# Patient Record
Sex: FEMALE
[De-identification: names, ages, dates, MRNs, and addresses within clinical notes are randomized per-mention and may not be internally consistent; named-entity substitution may affect disease eponyms.]

---

## 2017-08-03 ENCOUNTER — LAB REQUISITION (OUTPATIENT)
Dept: ADMINISTRATIVE | Age: 57
End: 2017-08-03
Payer: COMMERCIAL

## 2017-08-03 DIAGNOSIS — F19.10 DRUG ABUSE (HCC): ICD-10-CM

## 2017-08-03 DIAGNOSIS — F33.2 SEVERE RECURRENT MAJOR DEPRESSION WITHOUT PSYCHOTIC FEATURES (HCC): ICD-10-CM

## 2017-08-03 LAB
B-HCG UR QL: NEGATIVE
BACTERIA UR QL AUTO: NEGATIVE /HPF
BILIRUB UR QL: NEGATIVE
CLARITY UR: CLEAR
COLOR UR: YELLOW
GLUCOSE UR-MCNC: NEGATIVE MG/DL
HGB UR QL STRIP.AUTO: NEGATIVE
HYALINE CASTS #/AREA URNS AUTO: 1 /LPF
KETONES UR-MCNC: NEGATIVE MG/DL
NITRITE UR QL STRIP.AUTO: NEGATIVE
PH UR: 5 [PH] (ref 5–8)
PROT UR-MCNC: NEGATIVE MG/DL
RBC #/AREA URNS AUTO: 1 /HPF
SP GR UR STRIP: 1.01 (ref 1–1.03)
UROBILINOGEN UR STRIP-ACNC: <2
VIT C UR-MCNC: 20 MG/DL
WBC #/AREA URNS AUTO: 1 /HPF

## 2017-08-03 PROCEDURE — 81025 URINE PREGNANCY TEST: CPT | Performed by: OTHER

## 2017-08-03 PROCEDURE — 81001 URINALYSIS AUTO W/SCOPE: CPT | Performed by: OTHER

## 2017-08-07 LAB
ALBUMIN SERPL BCP-MCNC: 4 G/DL (ref 3.5–4.8)
ALBUMIN/GLOB SERPL: 1.5 {RATIO} (ref 1–2)
ALP SERPL-CCNC: 105 U/L (ref 32–100)
ALT SERPL-CCNC: 23 U/L (ref 14–54)
ANION GAP SERPL CALC-SCNC: 8 MMOL/L (ref 0–18)
AST SERPL-CCNC: 29 U/L (ref 15–41)
BASOPHILS # BLD: 0 K/UL (ref 0–0.2)
BASOPHILS NFR BLD: 1 %
BILIRUB SERPL-MCNC: 0.4 MG/DL (ref 0.3–1.2)
BUN SERPL-MCNC: 13 MG/DL (ref 8–20)
BUN/CREAT SERPL: 19.4 (ref 10–20)
CALCIUM SERPL-MCNC: 9.2 MG/DL (ref 8.5–10.5)
CHLORIDE SERPL-SCNC: 106 MMOL/L (ref 95–110)
CO2 SERPL-SCNC: 26 MMOL/L (ref 22–32)
CREAT SERPL-MCNC: 0.67 MG/DL (ref 0.5–1.5)
EOSINOPHIL # BLD: 0.2 K/UL (ref 0–0.7)
EOSINOPHIL NFR BLD: 4 %
ERYTHROCYTE [DISTWIDTH] IN BLOOD BY AUTOMATED COUNT: 15.5 % (ref 11–15)
GLOBULIN PLAS-MCNC: 2.6 G/DL (ref 2.5–3.7)
GLUCOSE SERPL-MCNC: 46 MG/DL (ref 70–99)
HCT VFR BLD AUTO: 39.2 % (ref 35–48)
HGB BLD-MCNC: 13.1 G/DL (ref 12–16)
LYMPHOCYTES # BLD: 1.2 K/UL (ref 1–4)
LYMPHOCYTES NFR BLD: 22 %
MCH RBC QN AUTO: 28.6 PG (ref 27–32)
MCHC RBC AUTO-ENTMCNC: 33.4 G/DL (ref 32–37)
MCV RBC AUTO: 85.5 FL (ref 80–100)
MONOCYTES # BLD: 0.6 K/UL (ref 0–1)
MONOCYTES NFR BLD: 10 %
NEUTROPHILS # BLD AUTO: 3.5 K/UL (ref 1.8–7.7)
NEUTROPHILS NFR BLD: 63 %
OSMOLALITY UR CALC.SUM OF ELEC: 287 MOSM/KG (ref 275–295)
PLATELET # BLD AUTO: 245 K/UL (ref 140–400)
PMV BLD AUTO: 9.5 FL (ref 7.4–10.3)
POTASSIUM SERPL-SCNC: 3.7 MMOL/L (ref 3.3–5.1)
PROT SERPL-MCNC: 6.6 G/DL (ref 5.9–8.4)
RBC # BLD AUTO: 4.58 M/UL (ref 3.7–5.4)
SODIUM SERPL-SCNC: 140 MMOL/L (ref 136–144)
TSH SERPL-ACNC: 1.12 UIU/ML (ref 0.45–5.33)
WBC # BLD AUTO: 5.6 K/UL (ref 4–11)

## 2017-08-07 PROCEDURE — 36415 COLL VENOUS BLD VENIPUNCTURE: CPT | Performed by: OTHER

## 2017-08-07 PROCEDURE — 80053 COMPREHEN METABOLIC PANEL: CPT | Performed by: OTHER

## 2017-08-07 PROCEDURE — 85025 COMPLETE CBC W/AUTO DIFF WBC: CPT | Performed by: OTHER

## 2017-08-07 PROCEDURE — 84443 ASSAY THYROID STIM HORMONE: CPT | Performed by: OTHER

## 2017-08-08 ENCOUNTER — LAB REQUISITION (OUTPATIENT)
Dept: ADMINISTRATIVE | Age: 57
End: 2017-08-08
Payer: COMMERCIAL

## 2017-08-08 DIAGNOSIS — F33.2 SEVERE RECURRENT MAJOR DEPRESSION WITHOUT PSYCHOTIC FEATURES (HCC): ICD-10-CM

## 2017-08-08 LAB
ALBUMIN SERPL BCP-MCNC: 3.8 G/DL (ref 3.5–4.8)
ALBUMIN/GLOB SERPL: 1.4 {RATIO} (ref 1–2)
ALP SERPL-CCNC: 95 U/L (ref 32–100)
ALT SERPL-CCNC: 19 U/L (ref 14–54)
ANION GAP SERPL CALC-SCNC: 6 MMOL/L (ref 0–18)
AST SERPL-CCNC: 25 U/L (ref 15–41)
BILIRUB SERPL-MCNC: 0.5 MG/DL (ref 0.3–1.2)
BUN SERPL-MCNC: 8 MG/DL (ref 8–20)
BUN/CREAT SERPL: 10.8 (ref 10–20)
CALCIUM SERPL-MCNC: 9.1 MG/DL (ref 8.5–10.5)
CHLORIDE SERPL-SCNC: 106 MMOL/L (ref 95–110)
CO2 SERPL-SCNC: 28 MMOL/L (ref 22–32)
CORTIS SERPL-MCNC: 10.8 MCG/DL
CREAT SERPL-MCNC: 0.74 MG/DL (ref 0.5–1.5)
GGT SERPL-CCNC: 17 U/L (ref 7–50)
GLOBULIN PLAS-MCNC: 2.8 G/DL (ref 2.5–3.7)
GLUCOSE SERPL-MCNC: 90 MG/DL (ref 70–99)
HBA1C MFR BLD: 6.1 % (ref 4–6)
INR BLD: 0.9 (ref 0.9–1.2)
OSMOLALITY UR CALC.SUM OF ELEC: 288 MOSM/KG (ref 275–295)
PHOSPHATE SERPL-MCNC: 4.4 MG/DL (ref 2.4–4.7)
POTASSIUM SERPL-SCNC: 4.2 MMOL/L (ref 3.3–5.1)
PREALB SERPL-MCNC: 25.9 MG/DL (ref 17.1–100)
PROT SERPL-MCNC: 6.6 G/DL (ref 5.9–8.4)
PROTHROMBIN TIME: 12.1 SECONDS (ref 11.8–14.5)
SODIUM SERPL-SCNC: 140 MMOL/L (ref 136–144)
VIT B12 SERPL-MCNC: 368 PG/ML (ref 181–914)

## 2017-08-08 PROCEDURE — 80053 COMPREHEN METABOLIC PANEL: CPT | Performed by: OTHER

## 2017-08-08 PROCEDURE — 82607 VITAMIN B-12: CPT | Performed by: OTHER

## 2017-08-08 PROCEDURE — 83525 ASSAY OF INSULIN: CPT | Performed by: OTHER

## 2017-08-08 PROCEDURE — 36415 COLL VENOUS BLD VENIPUNCTURE: CPT | Performed by: OTHER

## 2017-08-08 PROCEDURE — 82533 TOTAL CORTISOL: CPT | Performed by: OTHER

## 2017-08-08 PROCEDURE — 85610 PROTHROMBIN TIME: CPT | Performed by: OTHER

## 2017-08-08 PROCEDURE — 84681 ASSAY OF C-PEPTIDE: CPT | Performed by: OTHER

## 2017-08-08 PROCEDURE — 84100 ASSAY OF PHOSPHORUS: CPT | Performed by: OTHER

## 2017-08-08 PROCEDURE — 82977 ASSAY OF GGT: CPT | Performed by: OTHER

## 2017-08-08 PROCEDURE — 83036 HEMOGLOBIN GLYCOSYLATED A1C: CPT | Performed by: OTHER

## 2017-08-08 PROCEDURE — 84134 ASSAY OF PREALBUMIN: CPT | Performed by: OTHER

## 2017-08-09 LAB
C-PEPTIDE, SERUM OR PLASMA: 1.8 NG/ML
INSULIN SERPL-ACNC: 7.03 MIU/ML (ref 1.9–23)

## 2017-08-16 ENCOUNTER — LAB REQUISITION (OUTPATIENT)
Dept: ADMINISTRATIVE | Age: 57
End: 2017-08-16
Payer: COMMERCIAL

## 2017-08-16 DIAGNOSIS — F33.2 ENDOGENOUS DEPRESSION (HCC): ICD-10-CM

## 2017-08-17 LAB
ANION GAP SERPL CALC-SCNC: 12 MMOL/L (ref 0–18)
BUN SERPL-MCNC: 14 MG/DL (ref 8–20)
BUN/CREAT SERPL: 18.4 (ref 10–20)
CALCIUM SERPL-MCNC: 9.4 MG/DL (ref 8.5–10.5)
CHLORIDE SERPL-SCNC: 98 MMOL/L (ref 95–110)
CO2 SERPL-SCNC: 27 MMOL/L (ref 22–32)
CREAT SERPL-MCNC: 0.76 MG/DL (ref 0.5–1.5)
GLUCOSE SERPL-MCNC: 97 MG/DL (ref 70–99)
OSMOLALITY UR CALC.SUM OF ELEC: 284 MOSM/KG (ref 275–295)
POTASSIUM SERPL-SCNC: 3.4 MMOL/L (ref 3.3–5.1)
SODIUM SERPL-SCNC: 137 MMOL/L (ref 136–144)

## 2017-08-17 PROCEDURE — 36415 COLL VENOUS BLD VENIPUNCTURE: CPT | Performed by: OTHER

## 2017-08-17 PROCEDURE — 80048 BASIC METABOLIC PNL TOTAL CA: CPT | Performed by: OTHER

## 2025-05-30 ENCOUNTER — OFFICE VISIT (OUTPATIENT)
Dept: INTERNAL MEDICINE CLINIC | Facility: CLINIC | Age: 65
End: 2025-05-30

## 2025-05-30 VITALS
TEMPERATURE: 98 F | WEIGHT: 204 LBS | OXYGEN SATURATION: 96 % | HEIGHT: 64 IN | RESPIRATION RATE: 20 BRPM | SYSTOLIC BLOOD PRESSURE: 125 MMHG | DIASTOLIC BLOOD PRESSURE: 80 MMHG | HEART RATE: 66 BPM | BODY MASS INDEX: 34.83 KG/M2

## 2025-05-30 DIAGNOSIS — Z98.84 S/P GASTRIC BYPASS: ICD-10-CM

## 2025-05-30 DIAGNOSIS — M85.89 OSTEOPENIA OF MULTIPLE SITES: ICD-10-CM

## 2025-05-30 DIAGNOSIS — Z23 NEED FOR VACCINATION: Primary | ICD-10-CM

## 2025-05-30 DIAGNOSIS — M54.2 MYOFASCIAL NECK PAIN: ICD-10-CM

## 2025-05-30 DIAGNOSIS — Z90.710 S/P HYSTERECTOMY: ICD-10-CM

## 2025-05-30 DIAGNOSIS — Z12.31 ENCOUNTER FOR SCREENING MAMMOGRAM FOR BREAST CANCER: ICD-10-CM

## 2025-05-30 DIAGNOSIS — E55.9 VITAMIN D DEFICIENCY: ICD-10-CM

## 2025-05-30 DIAGNOSIS — K21.9 GASTROESOPHAGEAL REFLUX DISEASE, UNSPECIFIED WHETHER ESOPHAGITIS PRESENT: ICD-10-CM

## 2025-05-30 DIAGNOSIS — Z78.0 ENCOUNTER FOR OSTEOPOROSIS SCREENING IN ASYMPTOMATIC POSTMENOPAUSAL PATIENT: ICD-10-CM

## 2025-05-30 DIAGNOSIS — Z85.3 HISTORY OF BREAST CANCER: ICD-10-CM

## 2025-05-30 DIAGNOSIS — Z90.13 S/P BILATERAL MASTECTOMY: ICD-10-CM

## 2025-05-30 DIAGNOSIS — Z13.820 ENCOUNTER FOR OSTEOPOROSIS SCREENING IN ASYMPTOMATIC POSTMENOPAUSAL PATIENT: ICD-10-CM

## 2025-05-30 DIAGNOSIS — Z12.11 ENCOUNTER FOR SCREENING COLONOSCOPY: ICD-10-CM

## 2025-05-30 DIAGNOSIS — I10 BENIGN HYPERTENSION: ICD-10-CM

## 2025-05-30 DIAGNOSIS — F51.01 PRIMARY INSOMNIA: ICD-10-CM

## 2025-05-30 DIAGNOSIS — C54.1 ENDOMETRIAL ADENOCARCINOMA (HCC): ICD-10-CM

## 2025-05-30 DIAGNOSIS — Z00.01 ENCOUNTER FOR GENERAL ADULT MEDICAL EXAMINATION WITH ABNORMAL FINDINGS: ICD-10-CM

## 2025-05-30 DIAGNOSIS — D50.8 IRON DEFICIENCY ANEMIA SECONDARY TO INADEQUATE DIETARY IRON INTAKE: ICD-10-CM

## 2025-05-30 DIAGNOSIS — J45.20: ICD-10-CM

## 2025-05-30 DIAGNOSIS — E78.5 HYPERLIPIDEMIA, UNSPECIFIED HYPERLIPIDEMIA TYPE: ICD-10-CM

## 2025-05-30 DIAGNOSIS — F33.2 SEVERE RECURRENT MAJOR DEPRESSION WITHOUT PSYCHOTIC FEATURES (HCC): ICD-10-CM

## 2025-05-30 DIAGNOSIS — Z12.4 ENCOUNTER FOR SCREENING FOR CERVICAL CANCER: ICD-10-CM

## 2025-05-30 PROBLEM — F10.10 ETOH ABUSE: Status: ACTIVE | Noted: 2019-06-18

## 2025-05-30 PROBLEM — K80.51 CHOLEDOCHOLITHIASIS WITH OBSTRUCTION: Status: ACTIVE | Noted: 2019-02-13

## 2025-05-30 PROBLEM — F32.2 SEVERE MAJOR DEPRESSIVE DISORDER (HCC): Status: ACTIVE | Noted: 2017-03-04

## 2025-05-30 PROBLEM — L02.91 ABSCESS: Status: ACTIVE | Noted: 2020-04-19

## 2025-05-30 PROBLEM — D72.829 LEUKOCYTOSIS: Status: ACTIVE | Noted: 2020-04-19

## 2025-05-30 RX ORDER — TRIAMTERENE AND HYDROCHLOROTHIAZIDE 37.5; 25 MG/1; MG/1
1 TABLET ORAL DAILY
COMMUNITY

## 2025-05-30 RX ORDER — CYCLOBENZAPRINE HCL 10 MG
10 TABLET ORAL NIGHTLY
Qty: 30 TABLET | Refills: 1 | Status: SHIPPED | OUTPATIENT
Start: 2025-05-30

## 2025-05-30 RX ORDER — LAMOTRIGINE 200 MG/1
TABLET ORAL
COMMUNITY
Start: 2025-05-09

## 2025-05-30 RX ORDER — METOPROLOL SUCCINATE 50 MG/1
TABLET, EXTENDED RELEASE ORAL
COMMUNITY

## 2025-05-30 NOTE — PROGRESS NOTES
REASON FOR VISIT      Swati Correa is a 65 year old female who presents for  Annual Physical Exam (not Medicare, or ABN signed for Medicare non covered service) and scheduled follow up of multiple significant but stable problems.     HCM   - mammogram: Status post mastectomy  - colon: Last colonoscopy was done by Dr. Smith in 2019, for which a 10-year recall colonoscopy was advised-next due 2029.  - PAP: Status post hysterectomy  - DEXA: Last done 3/22/2023.  T-scores in the lumbar spine and femoral neck (R) were -1.7 and -1.2 respectively; the mean T-score for the bilateral hips was -1.9.  - Labs: Ordered today.  - imm: Flu shot? COVID booster? Shingles? PNA?  Needs flu shot in the fall, needs another COVID-vaccine, pneumonia up to date, tetanus up-to-date, needs shingles, needs RSV  - depression: See below    Patient has a history of a Samir-en-Y gastric bypass.    Iron deficiency anemia, likely due to malabsorption.  Does not tolerate oral iron.  Has at some point seen hematologist.  Last CBC was done 12/19/2024, notable for a hemoglobin of 10.4.  She also had ferritin and iron studies done on 4/30/2024; ferritin is quite low at 3.9, iron studies notable for low iron saturation, low normal serum iron, and high normal TIBC. She has gotten iron infusions in the past, ordered by her oncologist, who has retired.     GERD.  Patient continues on pantoprazole as needed.     Asthma.  Patient does not often have symptoms at baseline.  However, her exacerbations are often caused by upper respiratory tract infections.  At that time, she uses both albuterol and her Breo.    Hypertension.  Patient continues on metoprolol succinate 50 mg daily, triamterene-hydrochlorothiazide 37.5-25 mg daily.  Blood pressure today 125/80.    History of breast cancer, left-sided, treated in 2015.  Status post radiation and chemo.  Patient also has history of endometrial adenocarcinoma of the uterus, in 2018.    Anxiety/depression.  Patient  continues on venlafaxine 75 mg daily, lamotrigine 200 mg daily. She follows with Dr. Lutz.     Insomnia, patient on zolpidem. Takes it 1-2x/week.     Patient has some upper back and myofacial pain. Takes cyclobenzaprine, every so often.     Past Medical History     Past Medical History[1]     Past Surgical History     Past Surgical History[2]     Family History     Family History[3]    Social History     Short Social Hx on File[4]    Tobacco:   She currently has tobacco smoking, smokeless, or e-cigarette status listed as never assessed or unknown.    Please update the information in the Tobacco history section to reflect the true status, and refresh this smartlink.    CAGE Alcohol Screen:   Cage screening not needed because reported NO to Alcohol use on social history.    Depression Screening (PHQ):  PHQ-2/9 not done in last week, please ask questions. Last done              LAMONT-7 Total Score                 Allergies     Allergies[5]    Current Medications     Current Outpatient Medications   Medication Sig Dispense Refill    lamoTRIgine 200 MG Oral Tab TAKE 1 TABLET BY MOUTH ONCE A DAY STOP IF RASH APPEARS AND CALL MD      Albuterol Sulfate  (90 Base) MCG/ACT Inhalation Aero Soln Inhale 2 puffs into the lungs every 6 (six) hours as needed. 1 Inhaler 0    Venlafaxine HCl ER 37.5 MG Oral Capsule SR 24 Hr TK ONE C PO QD IN THE MORNING FOR 30 DAYS  1    metoprolol succinate ER 50 MG Oral Tablet 24 Hr TAKE 1 TABLET BY MOUTH DAILY. HOLD IF SYSTOLIC BP (TOP NUMBER OF BLOOD PRESSURE) <120      Triamterene-HCTZ 37.5-25 MG Oral Tab Take 1 tablet by mouth daily.      cyclobenzaprine 10 MG Oral Tab Take 1 tablet (10 mg total) by mouth nightly. (Patient not taking: Reported on 5/30/2025) 30 tablet 1    amLODIPine Besylate 2.5 MG Oral Tab Take 2.5 mg by mouth. (Patient not taking: Reported on 5/30/2025)      letrozole 2.5 MG Oral Tab TK 1 T PO QD (Patient not taking: Reported on 5/30/2025)  3    methylPREDNISolone 4  MG Oral Tablet Therapy Pack Use as directed for 6 days (Patient not taking: Reported on 5/30/2025) 21 tablet 0     No current facility-administered medications for this visit.       Screenings     History/Other:   Fall Risk Assessment:    (Incomplete)        Cognitive Assessment:    (Incomplete)  Tip  Cognitive assessment incomplete. Refresh to ensure screening pulls in; if not,click link above to assess, then refresh:[id number]}      Functional Ability/Status:    (Incomplete)      Immunization History   Administered Date(s) Administered    >=3 YRS TRI  MULTIDOSE VIAL (95427) FLU CLINIC 10/20/2016    Covid-19 Vaccine Pfizer 30 mcg/0.3 ml 12/30/2020, 01/17/2021, 11/05/2021    FLUZONE 6 months and older PFS 0.5 ml (17424) 01/03/2012, 12/01/2012, 10/15/2017, 10/30/2019, 11/17/2023    Pneumococcal (Prevnar 13) 12/14/2016    Pneumovax 23 10/25/2014    TDAP 12/29/2018       Health Maintenance   Topic Date Due    Colorectal Cancer Screening  Never done    Asthma Control Test  Never done    Annual Physical  Never done    Mammogram  Never done    Zoster Vaccines (1 of 2) Never done    Pneumococcal Vaccine: 50+ Years (3 of 3 - PPSV23, PCV20 or PCV21) 10/25/2019    COVID-19 Vaccine (4 - 2024-25 season) 09/01/2024    Annual Depression Screening  Never done    Influenza Vaccine (Season Ended) 10/01/2025    DTaP,Tdap,and Td Vaccines (2 - Td or Tdap) 12/29/2028    Meningococcal B Vaccine  Aged Out         Review of Systems     GENERAL HEALTH: feels well otherwise  SKIN: denies any unusual skin lesions or rashes  RESPIRATORY: denies shortness of breath with exertion  CARDIOVASCULAR: denies chest pain on exertion  GI: denies abdominal pain and denies heartburn  : denies any burning with urination, urinary frequency or urgency  NEURO: denies headaches, numbness or tingling, mental status changes  PSYCH: denies depressed mood, anxiety  MUSC: denies muscle aches, joint pain      Physical Exam     EXAM:  Ht 5' 4\" (1.626 m)   Wt 204 lb  (92.5 kg)   BMI 35.02 kg/m²   >   BP Readings from Last 3 Encounters:   12/28/19 141/81   08/27/19 138/84     No LMP recorded. Patient has had a hysterectomy.  GENERAL: well developed, well nourished, in no apparent distress  SKIN: no rashes, no suspicious lesions  HEENT: atraumatic, normocephalic, ears and throat are clear  EYES:PERRLA, EOMI, conjunctiva are clear.   NECK: supple, no adenopathy, no bruits  CHEST: no chest tenderness  BREAST: deferred   LUNGS: clear to auscultation  CARDIO: RRR without murmur  GI: good BS's, no masses, HSM or tenderness  :deferred  RECTAL: deferred  MUSCULOSKELETAL: back is not tender, FROM of the back  EXTREMITIES: no cyanosis, clubbing or edema  NEURO: Oriented times three, cranial nerves are intact, motor and sensory are grossly intact  FOOT: deferred      Assessment and Plan     Swati Correa is a 65 year old female who presents for an Annual Health Assessment.     Assessment & Plan  Need for vaccination    Orders:    Prevnar 20 (PCV20) [96477]    Zoster Recombinant Adjuvanted (Shingrix -Shingles) [37960]    Encounter for general adult medical examination with abnormal findings  Age appropriate screenings and vaccinations discussed. Counseled on healthy diet, exercise, sleep hygiene. Patient advised that any additional concerns not included in a routine physical may result in additional charges and/or a copay. Patient verbally acknowledged this information and agreed to proceed.    Orders:    cyclobenzaprine 10 MG Oral Tab; Take 1 tablet (10 mg total) by mouth nightly.    Prevnar 20 (PCV20) [83627]    Zoster Recombinant Adjuvanted (Shingrix -Shingles) [89093]    CBC With Differential With Platelet; Future    Comp Metabolic Panel (14); Future    Lipid Panel; Future    TSH W Reflex To Free T4; Future    Hemoglobin A1C; Future    Vitamin D; Future    Ferritin [E]; Future    Iron And Tibc [E]; Future    Vitamin B12 [E]; Future    Folic Acid Serum [E]; Future    XR DEXA BONE  DENSITOMETRY (CPT=77080); Future    Encounter for screening colonoscopy  Due 2029.       Encounter for screening for cervical cancer  Endometrial adenocarcinoma (HCC)  S/P hysterectomy  No need for cervical cancer screening as patient is status post hysterectomy.       S/P bilateral mastectomy  History of breast cancer  Encounter for screening mammogram for breast cancer  No more need for more mammograms.  Patient does not have breast tissue.       Severe recurrent major depression without psychotic features (HCC)  Following with psychiatry.  She continues on venlafaxine and lamotrigine.  Orders:    cyclobenzaprine 10 MG Oral Tab; Take 1 tablet (10 mg total) by mouth nightly.    Prevnar 20 (PCV20) [02936]    Zoster Recombinant Adjuvanted (Shingrix -Shingles) [19505]    CBC With Differential With Platelet; Future    Comp Metabolic Panel (14); Future    Lipid Panel; Future    TSH W Reflex To Free T4; Future    Hemoglobin A1C; Future    Vitamin D; Future    Ferritin [E]; Future    Iron And Tibc [E]; Future    Vitamin B12 [E]; Future    Folic Acid Serum [E]; Future    XR DEXA BONE DENSITOMETRY (CPT=77080); Future    Benign hypertension  Continue medication as above.  Recheck kidney function electrolytes demonstrate stability.  Orders:    cyclobenzaprine 10 MG Oral Tab; Take 1 tablet (10 mg total) by mouth nightly.    Prevnar 20 (PCV20) [02533]    Zoster Recombinant Adjuvanted (Shingrix -Shingles) [52403]    CBC With Differential With Platelet; Future    Comp Metabolic Panel (14); Future    Lipid Panel; Future    TSH W Reflex To Free T4; Future    Hemoglobin A1C; Future    Vitamin D; Future    Ferritin [E]; Future    Iron And Tibc [E]; Future    Vitamin B12 [E]; Future    Folic Acid Serum [E]; Future    XR DEXA BONE DENSITOMETRY (CPT=77080); Future    Hyperlipidemia, unspecified hyperlipidemia type  Recheck cholesterol panel.  Orders:    cyclobenzaprine 10 MG Oral Tab; Take 1 tablet (10 mg total) by mouth nightly.     Prevnar 20 (PCV20) [07805]    Zoster Recombinant Adjuvanted (Shingrix -Shingles) [49547]    CBC With Differential With Platelet; Future    Comp Metabolic Panel (14); Future    Lipid Panel; Future    TSH W Reflex To Free T4; Future    Hemoglobin A1C; Future    Vitamin D; Future    Ferritin [E]; Future    Iron And Tibc [E]; Future    Vitamin B12 [E]; Future    Folic Acid Serum [E]; Future    XR DEXA BONE DENSITOMETRY (CPT=77080); Future    Mild intermittent intrinsic asthma without complication (HCC)  Use of albuterol as needed.  If she finds that she is reaching for the albuterol more than 2 or 3 times a week, then she should restart the Breo, and then follow-up for further evaluation.  Orders:    cyclobenzaprine 10 MG Oral Tab; Take 1 tablet (10 mg total) by mouth nightly.    Prevnar 20 (PCV20) [28359]    Zoster Recombinant Adjuvanted (Shingrix -Shingles) [53691]    CBC With Differential With Platelet; Future    Comp Metabolic Panel (14); Future    Lipid Panel; Future    TSH W Reflex To Free T4; Future    Hemoglobin A1C; Future    Vitamin D; Future    Ferritin [E]; Future    Iron And Tibc [E]; Future    Vitamin B12 [E]; Future    Folic Acid Serum [E]; Future    XR DEXA BONE DENSITOMETRY (CPT=77080); Future    Vitamin D deficiency  Check vitamin D.  Orders:    cyclobenzaprine 10 MG Oral Tab; Take 1 tablet (10 mg total) by mouth nightly.    Prevnar 20 (PCV20) [64740]    Zoster Recombinant Adjuvanted (Shingrix -Shingles) [78282]    CBC With Differential With Platelet; Future    Comp Metabolic Panel (14); Future    Lipid Panel; Future    TSH W Reflex To Free T4; Future    Hemoglobin A1C; Future    Vitamin D; Future    Ferritin [E]; Future    Iron And Tibc [E]; Future    Vitamin B12 [E]; Future    Folic Acid Serum [E]; Future    XR DEXA BONE DENSITOMETRY (CPT=77080); Future    Gastroesophageal reflux disease, unspecified whether esophagitis present  Continue pantoprazole as needed.  Orders:    cyclobenzaprine 10 MG Oral  Tab; Take 1 tablet (10 mg total) by mouth nightly.    Prevnar 20 (PCV20) [91947]    Zoster Recombinant Adjuvanted (Shingrix -Shingles) [40155]    CBC With Differential With Platelet; Future    Comp Metabolic Panel (14); Future    Lipid Panel; Future    TSH W Reflex To Free T4; Future    Hemoglobin A1C; Future    Vitamin D; Future    Ferritin [E]; Future    Iron And Tibc [E]; Future    Vitamin B12 [E]; Future    Folic Acid Serum [E]; Future    XR DEXA BONE DENSITOMETRY (CPT=77080); Future    Primary insomnia  Continue zolpidem as needed.  Orders:    cyclobenzaprine 10 MG Oral Tab; Take 1 tablet (10 mg total) by mouth nightly.    Prevnar 20 (PCV20) [51703]    Zoster Recombinant Adjuvanted (Shingrix -Shingles) [82280]    CBC With Differential With Platelet; Future    Comp Metabolic Panel (14); Future    Lipid Panel; Future    TSH W Reflex To Free T4; Future    Hemoglobin A1C; Future    Vitamin D; Future    Ferritin [E]; Future    Iron And Tibc [E]; Future    Vitamin B12 [E]; Future    Folic Acid Serum [E]; Future    XR DEXA BONE DENSITOMETRY (CPT=77080); Future    Myofascial neck pain  Continue cyclobenzaprine as needed.  Orders:    cyclobenzaprine 10 MG Oral Tab; Take 1 tablet (10 mg total) by mouth nightly.    Prevnar 20 (PCV20) [35327]    Zoster Recombinant Adjuvanted (Shingrix -Shingles) [13027]    CBC With Differential With Platelet; Future    Comp Metabolic Panel (14); Future    Lipid Panel; Future    TSH W Reflex To Free T4; Future    Hemoglobin A1C; Future    Vitamin D; Future    Ferritin [E]; Future    Iron And Tibc [E]; Future    Vitamin B12 [E]; Future    Folic Acid Serum [E]; Future    XR DEXA BONE DENSITOMETRY (CPT=77080); Future    S/P gastric bypass  Check ferritin, B12, folate to evaluate for malabsorption.  Orders:    cyclobenzaprine 10 MG Oral Tab; Take 1 tablet (10 mg total) by mouth nightly.    Prevnar 20 (PCV20) [96014]    Zoster Recombinant Adjuvanted (Shingrix -Shingles) [51725]    CBC With  Differential With Platelet; Future    Comp Metabolic Panel (14); Future    Lipid Panel; Future    TSH W Reflex To Free T4; Future    Hemoglobin A1C; Future    Vitamin D; Future    Ferritin [E]; Future    Iron And Tibc [E]; Future    Vitamin B12 [E]; Future    Folic Acid Serum [E]; Future    XR DEXA BONE DENSITOMETRY (CPT=77080); Future    Iron deficiency anemia secondary to inadequate dietary iron intake  Repeat CBC and iron studies.  Patient is intolerant to oral iron.  Can consider referral to hematology for iron infusions should patient be symptomatic, with commensurate anemia and iron deficiency seen on iron studies.  Recheck B12 and folate to evaluate for malabsorption.  Orders:    cyclobenzaprine 10 MG Oral Tab; Take 1 tablet (10 mg total) by mouth nightly.    Prevnar 20 (PCV20) [39829]    Zoster Recombinant Adjuvanted (Shingrix -Shingles) [09109]    CBC With Differential With Platelet; Future    Comp Metabolic Panel (14); Future    Lipid Panel; Future    TSH W Reflex To Free T4; Future    Hemoglobin A1C; Future    Vitamin D; Future    Ferritin [E]; Future    Iron And Tibc [E]; Future    Vitamin B12 [E]; Future    Folic Acid Serum [E]; Future    XR DEXA BONE DENSITOMETRY (CPT=77080); Future    Osteopenia of multiple sites  Encounter for osteoporosis screening in asymptomatic postmenopausal patient  Due for repeat vitamin D testing and bone scan.  Both ordered.  Orders:    cyclobenzaprine 10 MG Oral Tab; Take 1 tablet (10 mg total) by mouth nightly.    Prevnar 20 (PCV20) [97878]    Zoster Recombinant Adjuvanted (Shingrix -Shingles) [67115]    CBC With Differential With Platelet; Future    Comp Metabolic Panel (14); Future    Lipid Panel; Future    TSH W Reflex To Free T4; Future    Hemoglobin A1C; Future    Vitamin D; Future    Ferritin [E]; Future    Iron And Tibc [E]; Future    Vitamin B12 [E]; Future    Folic Acid Serum [E]; Future    XR DEXA BONE DENSITOMETRY (CPT=77080); Future         The patient  indicates understanding of these issues and agrees to the plan.  The patient is asked to return in 6 months for office visit  Diet counseling perfomed  Exercise counseling perfomed    Electronically signed by Brit Gary MD 05/30/25       [1] History reviewed. No pertinent past medical history.  [2] History reviewed. No pertinent surgical history.  [3]   Family History  Family history unknown: Yes   [4]   Social History  Socioeconomic History    Marital status: Unknown   Tobacco Use    Smoking status: Never    Smokeless tobacco: Never   Vaping Use    Vaping status: Never Used   Substance and Sexual Activity    Alcohol use: Yes    Drug use: Never    Sexual activity: Not Currently   [5]   Allergies  Allergen Reactions    Quetiapine PALPITATIONS    Trazodone PALPITATIONS    Codeine ITCHING    Hydrocodone-Acetaminophen ITCHING     Patient reports taking Norco in the past and it has made her itchy.

## 2025-07-23 DIAGNOSIS — D50.8 IRON DEFICIENCY ANEMIA SECONDARY TO INADEQUATE DIETARY IRON INTAKE: ICD-10-CM

## 2025-07-23 DIAGNOSIS — Z98.84 S/P GASTRIC BYPASS: ICD-10-CM

## 2025-07-23 DIAGNOSIS — K21.9 GASTROESOPHAGEAL REFLUX DISEASE, UNSPECIFIED WHETHER ESOPHAGITIS PRESENT: ICD-10-CM

## 2025-07-23 DIAGNOSIS — J45.20: ICD-10-CM

## 2025-07-23 DIAGNOSIS — F51.01 PRIMARY INSOMNIA: ICD-10-CM

## 2025-07-23 DIAGNOSIS — I10 BENIGN HYPERTENSION: ICD-10-CM

## 2025-07-23 DIAGNOSIS — F33.2 SEVERE RECURRENT MAJOR DEPRESSION WITHOUT PSYCHOTIC FEATURES (HCC): ICD-10-CM

## 2025-07-23 DIAGNOSIS — E78.5 HYPERLIPIDEMIA, UNSPECIFIED HYPERLIPIDEMIA TYPE: ICD-10-CM

## 2025-07-23 DIAGNOSIS — Z00.01 ENCOUNTER FOR GENERAL ADULT MEDICAL EXAMINATION WITH ABNORMAL FINDINGS: ICD-10-CM

## 2025-07-23 DIAGNOSIS — E55.9 VITAMIN D DEFICIENCY: ICD-10-CM

## 2025-07-23 DIAGNOSIS — M54.2 MYOFASCIAL NECK PAIN: ICD-10-CM

## 2025-07-23 DIAGNOSIS — M85.89 OSTEOPENIA OF MULTIPLE SITES: ICD-10-CM

## 2025-07-24 RX ORDER — CYCLOBENZAPRINE HCL 10 MG
10 TABLET ORAL NIGHTLY
Qty: 30 TABLET | Refills: 1 | Status: SHIPPED | OUTPATIENT
Start: 2025-07-24

## 2025-07-24 NOTE — TELEPHONE ENCOUNTER
Please Review. Protocol Failed; No Protocol     Requested Prescriptions   Pending Prescriptions Disp Refills    CYCLOBENZAPRINE 10 MG Oral Tab [Pharmacy Med Name: CYCLOBENZAPRINE 10 MG TABLET] 30 tablet 1     Sig: TAKE 1 TABLET BY MOUTH EVERY DAY AT NIGHT       There is no refill protocol information for this order

## 2025-08-18 ENCOUNTER — HOSPITAL ENCOUNTER (OUTPATIENT)
Dept: BONE DENSITY | Facility: HOSPITAL | Age: 65
Discharge: HOME | End: 2025-08-18
Attending: INTERNAL MEDICINE

## 2025-08-18 ENCOUNTER — LAB ENCOUNTER (OUTPATIENT)
Dept: LAB | Facility: HOSPITAL | Age: 65
End: 2025-08-18
Attending: INTERNAL MEDICINE

## 2025-08-18 DIAGNOSIS — Z13.820 ENCOUNTER FOR OSTEOPOROSIS SCREENING IN ASYMPTOMATIC POSTMENOPAUSAL PATIENT: ICD-10-CM

## 2025-08-18 DIAGNOSIS — K21.9 GASTROESOPHAGEAL REFLUX DISEASE, UNSPECIFIED WHETHER ESOPHAGITIS PRESENT: ICD-10-CM

## 2025-08-18 DIAGNOSIS — I10 BENIGN HYPERTENSION: ICD-10-CM

## 2025-08-18 DIAGNOSIS — E55.9 VITAMIN D DEFICIENCY: ICD-10-CM

## 2025-08-18 DIAGNOSIS — M54.2 MYOFASCIAL NECK PAIN: ICD-10-CM

## 2025-08-18 DIAGNOSIS — F33.2 SEVERE RECURRENT MAJOR DEPRESSION WITHOUT PSYCHOTIC FEATURES (HCC): ICD-10-CM

## 2025-08-18 DIAGNOSIS — D50.8 IRON DEFICIENCY ANEMIA SECONDARY TO INADEQUATE DIETARY IRON INTAKE: ICD-10-CM

## 2025-08-18 DIAGNOSIS — Z98.84 S/P GASTRIC BYPASS: ICD-10-CM

## 2025-08-18 DIAGNOSIS — E78.5 HYPERLIPIDEMIA, UNSPECIFIED HYPERLIPIDEMIA TYPE: ICD-10-CM

## 2025-08-18 DIAGNOSIS — M85.89 OSTEOPENIA OF MULTIPLE SITES: ICD-10-CM

## 2025-08-18 DIAGNOSIS — J45.20: ICD-10-CM

## 2025-08-18 DIAGNOSIS — Z00.01 ENCOUNTER FOR GENERAL ADULT MEDICAL EXAMINATION WITH ABNORMAL FINDINGS: ICD-10-CM

## 2025-08-18 DIAGNOSIS — F51.01 PRIMARY INSOMNIA: ICD-10-CM

## 2025-08-18 DIAGNOSIS — Z78.0 ENCOUNTER FOR OSTEOPOROSIS SCREENING IN ASYMPTOMATIC POSTMENOPAUSAL PATIENT: ICD-10-CM

## 2025-08-18 LAB
ALBUMIN SERPL-MCNC: 4.9 G/DL (ref 3.2–4.8)
ALBUMIN/GLOB SERPL: 1.8 (ref 1–2)
ALP LIVER SERPL-CCNC: 117 U/L (ref 50–130)
ALT SERPL-CCNC: 21 U/L (ref 10–49)
ANION GAP SERPL CALC-SCNC: 5 MMOL/L (ref 0–18)
AST SERPL-CCNC: 25 U/L (ref ?–34)
BASOPHILS # BLD AUTO: 0.04 X10(3) UL (ref 0–0.2)
BASOPHILS NFR BLD AUTO: 0.6 %
BILIRUB SERPL-MCNC: 0.5 MG/DL (ref 0.2–1.1)
BUN BLD-MCNC: 15 MG/DL (ref 9–23)
BUN/CREAT SERPL: 15.5 (ref 10–20)
CALCIUM BLD-MCNC: 9.5 MG/DL (ref 8.7–10.4)
CHLORIDE SERPL-SCNC: 104 MMOL/L (ref 98–112)
CHOLEST SERPL-MCNC: 242 MG/DL (ref ?–200)
CO2 SERPL-SCNC: 30 MMOL/L (ref 21–32)
CREAT BLD-MCNC: 0.97 MG/DL (ref 0.55–1.02)
DEPRECATED HBV CORE AB SER IA-ACNC: 63 NG/ML (ref 50–306)
DEPRECATED RDW RBC AUTO: 41.2 FL (ref 35.1–46.3)
EGFRCR SERPLBLD CKD-EPI 2021: 66 ML/MIN/1.73M2 (ref 60–?)
EOSINOPHIL # BLD AUTO: 0.2 X10(3) UL (ref 0–0.7)
EOSINOPHIL NFR BLD AUTO: 3 %
ERYTHROCYTE [DISTWIDTH] IN BLOOD BY AUTOMATED COUNT: 12.1 % (ref 11–15)
EST. AVERAGE GLUCOSE BLD GHB EST-MCNC: 108 MG/DL (ref 68–126)
FASTING PATIENT LIPID ANSWER: YES
FASTING STATUS PATIENT QL REPORTED: YES
FOLATE SERPL-MCNC: 14.3 NG/ML (ref 5.4–?)
GLOBULIN PLAS-MCNC: 2.7 G/DL (ref 2–3.5)
GLUCOSE BLD-MCNC: 113 MG/DL (ref 70–99)
HBA1C MFR BLD: 5.4 % (ref ?–5.7)
HCT VFR BLD AUTO: 42.9 % (ref 35–48)
HDLC SERPL-MCNC: 68 MG/DL (ref 40–59)
HGB BLD-MCNC: 14.5 G/DL (ref 12–16)
IMM GRANULOCYTES # BLD AUTO: 0.03 X10(3) UL (ref 0–1)
IMM GRANULOCYTES NFR BLD: 0.4 %
IRON SATN MFR SERPL: 28 % (ref 15–50)
IRON SERPL-MCNC: 90 UG/DL (ref 50–170)
LDLC SERPL CALC-MCNC: 151 MG/DL (ref ?–100)
LYMPHOCYTES # BLD AUTO: 1.68 X10(3) UL (ref 1–4)
LYMPHOCYTES NFR BLD AUTO: 24.9 %
MCH RBC QN AUTO: 31.3 PG (ref 26–34)
MCHC RBC AUTO-ENTMCNC: 33.8 G/DL (ref 31–37)
MCV RBC AUTO: 92.5 FL (ref 80–100)
MONOCYTES # BLD AUTO: 0.6 X10(3) UL (ref 0.1–1)
MONOCYTES NFR BLD AUTO: 8.9 %
NEUTROPHILS # BLD AUTO: 4.21 X10 (3) UL (ref 1.5–7.7)
NEUTROPHILS # BLD AUTO: 4.21 X10(3) UL (ref 1.5–7.7)
NEUTROPHILS NFR BLD AUTO: 62.2 %
NONHDLC SERPL-MCNC: 174 MG/DL (ref ?–130)
OSMOLALITY SERPL CALC.SUM OF ELEC: 290 MOSM/KG (ref 275–295)
PLATELET # BLD AUTO: 289 10(3)UL (ref 150–450)
POTASSIUM SERPL-SCNC: 4.1 MMOL/L (ref 3.5–5.1)
PROT SERPL-MCNC: 7.6 G/DL (ref 5.7–8.2)
RBC # BLD AUTO: 4.64 X10(6)UL (ref 3.8–5.3)
SODIUM SERPL-SCNC: 139 MMOL/L (ref 136–145)
TOTAL IRON BINDING CAPACITY: 323 UG/DL (ref 250–425)
TRANSFERRIN SERPL-MCNC: 253 MG/DL (ref 250–380)
TRIGL SERPL-MCNC: 132 MG/DL (ref 30–149)
TSI SER-ACNC: 0.84 UIU/ML (ref 0.55–4.78)
VIT B12 SERPL-MCNC: 500 PG/ML (ref 211–911)
VIT D+METAB SERPL-MCNC: 35.3 NG/ML (ref 30–100)
VLDLC SERPL CALC-MCNC: 25 MG/DL (ref 0–30)
WBC # BLD AUTO: 6.8 X10(3) UL (ref 4–11)

## 2025-08-18 PROCEDURE — 85025 COMPLETE CBC W/AUTO DIFF WBC: CPT

## 2025-08-18 PROCEDURE — 83540 ASSAY OF IRON: CPT

## 2025-08-18 PROCEDURE — 82728 ASSAY OF FERRITIN: CPT

## 2025-08-18 PROCEDURE — 80053 COMPREHEN METABOLIC PANEL: CPT

## 2025-08-18 PROCEDURE — 80061 LIPID PANEL: CPT

## 2025-08-18 PROCEDURE — 82746 ASSAY OF FOLIC ACID SERUM: CPT

## 2025-08-18 PROCEDURE — 82306 VITAMIN D 25 HYDROXY: CPT

## 2025-08-18 PROCEDURE — 77080 DXA BONE DENSITY AXIAL: CPT | Performed by: INTERNAL MEDICINE

## 2025-08-18 PROCEDURE — 84466 ASSAY OF TRANSFERRIN: CPT

## 2025-08-18 PROCEDURE — 36415 COLL VENOUS BLD VENIPUNCTURE: CPT

## 2025-08-18 PROCEDURE — 82607 VITAMIN B-12: CPT

## 2025-08-18 PROCEDURE — 84443 ASSAY THYROID STIM HORMONE: CPT

## 2025-08-18 PROCEDURE — 83036 HEMOGLOBIN GLYCOSYLATED A1C: CPT
